# Patient Record
Sex: MALE | Race: WHITE
[De-identification: names, ages, dates, MRNs, and addresses within clinical notes are randomized per-mention and may not be internally consistent; named-entity substitution may affect disease eponyms.]

---

## 2018-04-01 ENCOUNTER — HOSPITAL ENCOUNTER (EMERGENCY)
Dept: HOSPITAL 62 - ER | Age: 33
End: 2018-04-01
Payer: SELF-PAY

## 2018-04-01 VITALS — DIASTOLIC BLOOD PRESSURE: 18 MMHG | SYSTOLIC BLOOD PRESSURE: 161 MMHG

## 2018-04-01 DIAGNOSIS — Q89.1: ICD-10-CM

## 2018-04-01 DIAGNOSIS — I46.9: Primary | ICD-10-CM

## 2018-04-01 LAB
ABSOLUTE LYMPHOCYTES# (MANUAL): 5 10^3/UL (ref 0.5–4.7)
ABSOLUTE MONOCYTES # (MANUAL): 0.8 10^3/UL (ref 0.1–1.4)
ABSOLUTE NEUTROPHILS# (MANUAL): 20.5 10^3/UL (ref 1.7–8.2)
ADD MANUAL DIFF: YES
ALBUMIN SERPL-MCNC: 2.2 G/DL (ref 3.5–5)
ALP SERPL-CCNC: 35 U/L (ref 38–126)
ALT SERPL-CCNC: 143 U/L (ref 21–72)
ANION GAP SERPL CALC-SCNC: 9 MMOL/L (ref 5–19)
ANISOCYTOSIS BLD QL SMEAR: SLIGHT
APPEARANCE UR: CLEAR
APTT PPP: COLORLESS S
AST SERPL-CCNC: 181 U/L (ref 17–59)
BASE EXCESS BLDV CALC-SCNC: -21.6 MMOL/L
BASOPHILS NFR BLD MANUAL: 0 % (ref 0–2)
BILIRUB DIRECT SERPL-MCNC: 0.3 MG/DL (ref 0–0.4)
BILIRUB SERPL-MCNC: 0.4 MG/DL (ref 0.2–1.3)
BILIRUB UR QL STRIP: NEGATIVE
BUN SERPL-MCNC: 40 MG/DL (ref 7–20)
CALCIUM: 5.8 MG/DL (ref 8.4–10.2)
CHLORIDE SERPL-SCNC: 94 MMOL/L (ref 98–107)
CO2 SERPL-SCNC: 12 MMOL/L (ref 22–30)
EOSINOPHIL NFR BLD MANUAL: 0 % (ref 0–6)
ERYTHROCYTE [DISTWIDTH] IN BLOOD BY AUTOMATED COUNT: 14.2 % (ref 11.5–14)
GLUCOSE SERPL-MCNC: 366 MG/DL (ref 75–110)
GLUCOSE UR STRIP-MCNC: 1000 MG/DL
HCO3 BLDV-SCNC: 11.6 MMOL/L (ref 20–32)
HCT VFR BLD CALC: 39.6 % (ref 37.9–51)
HGB BLD-MCNC: 13.2 G/DL (ref 13.5–17)
INR PPP: 1.95
KETONES UR STRIP-MCNC: NEGATIVE MG/DL
MCH RBC QN AUTO: 29.1 PG (ref 27–33.4)
MCHC RBC AUTO-ENTMCNC: 33.3 G/DL (ref 32–36)
MCV RBC AUTO: 87 FL (ref 80–97)
METAMYELOCYTES % (MANUAL): 4 %
MONOCYTES % (MANUAL): 3 % (ref 3–13)
MYELOCYTES % (MANUAL): 2 %
NEUTS BAND NFR BLD MANUAL: 19 % (ref 3–5)
NITRITE UR QL STRIP: NEGATIVE
PAPPENHEIMER BODIES: PRESENT
PCO2 BLDV: 60.4 MMHG (ref 35–63)
PH BLDV: 6.9 [PH] (ref 7.3–7.42)
PH UR STRIP: 7 [PH] (ref 5–9)
PLATELET # BLD: 292 10^3/UL (ref 150–450)
PLATELET CLUMP BLD QL SMEAR: PRESENT
PLATELET COMMENT: ADEQUATE
POLYCHROMASIA BLD QL SMEAR: SLIGHT
POTASSIUM SERPL-SCNC: 6 MMOL/L (ref 3.6–5)
PROT SERPL-MCNC: 3.8 G/DL (ref 6.3–8.2)
PROT UR STRIP-MCNC: 100 MG/DL
PROTHROMBIN TIME: 23.3 SEC (ref 11.4–15.4)
RBC # BLD AUTO: 4.53 10^6/UL (ref 4.35–5.55)
SEGMENTED NEUTROPHILS % (MAN): 53 % (ref 42–78)
SMUDGE CELLS # BLD: PRESENT 10*3/UL
SODIUM SERPL-SCNC: 114.6 MMOL/L (ref 137–145)
SP GR UR STRIP: 1.01
TOTAL CELLS COUNTED BLD: 100
UROBILINOGEN UR-MCNC: NEGATIVE MG/DL (ref ?–2)
VARIANT LYMPHS NFR BLD MANUAL: 18 % (ref 13–45)
WBC # BLD AUTO: 26.3 10^3/UL (ref 4–10.5)
WBC TOXIC VACUOLES BLD QL SMEAR: PRESENT

## 2018-04-01 PROCEDURE — 81001 URINALYSIS AUTO W/SCOPE: CPT

## 2018-04-01 PROCEDURE — 80053 COMPREHEN METABOLIC PANEL: CPT

## 2018-04-01 PROCEDURE — 82962 GLUCOSE BLOOD TEST: CPT

## 2018-04-01 PROCEDURE — 36556 INSERT NON-TUNNEL CV CATH: CPT

## 2018-04-01 PROCEDURE — 87040 BLOOD CULTURE FOR BACTERIA: CPT

## 2018-04-01 PROCEDURE — 85025 COMPLETE CBC W/AUTO DIFF WBC: CPT

## 2018-04-01 PROCEDURE — 06HM33Z INSERTION OF INFUSION DEVICE INTO RIGHT FEMORAL VEIN, PERCUTANEOUS APPROACH: ICD-10-PCS | Performed by: EMERGENCY MEDICINE

## 2018-04-01 PROCEDURE — 87086 URINE CULTURE/COLONY COUNT: CPT

## 2018-04-01 PROCEDURE — 36415 COLL VENOUS BLD VENIPUNCTURE: CPT

## 2018-04-01 PROCEDURE — 71045 X-RAY EXAM CHEST 1 VIEW: CPT

## 2018-04-01 PROCEDURE — 99291 CRITICAL CARE FIRST HOUR: CPT

## 2018-04-01 PROCEDURE — 0BH17EZ INSERTION OF ENDOTRACHEAL AIRWAY INTO TRACHEA, VIA NATURAL OR ARTIFICIAL OPENING: ICD-10-PCS | Performed by: EMERGENCY MEDICINE

## 2018-04-01 PROCEDURE — 96375 TX/PRO/DX INJ NEW DRUG ADDON: CPT

## 2018-04-01 PROCEDURE — 92950 HEART/LUNG RESUSCITATION CPR: CPT

## 2018-04-01 PROCEDURE — 82803 BLOOD GASES ANY COMBINATION: CPT

## 2018-04-01 PROCEDURE — 93010 ELECTROCARDIOGRAM REPORT: CPT

## 2018-04-01 PROCEDURE — 74018 RADEX ABDOMEN 1 VIEW: CPT

## 2018-04-01 PROCEDURE — 96365 THER/PROPH/DIAG IV INF INIT: CPT

## 2018-04-01 PROCEDURE — 94640 AIRWAY INHALATION TREATMENT: CPT

## 2018-04-01 PROCEDURE — C1751 CATH, INF, PER/CENT/MIDLINE: HCPCS

## 2018-04-01 PROCEDURE — 31500 INSERT EMERGENCY AIRWAY: CPT

## 2018-04-01 PROCEDURE — 93005 ELECTROCARDIOGRAM TRACING: CPT

## 2018-04-01 PROCEDURE — 83605 ASSAY OF LACTIC ACID: CPT

## 2018-04-01 PROCEDURE — 85610 PROTHROMBIN TIME: CPT

## 2018-04-01 NOTE — ER DOCUMENT REPORT
ED Resuscitation





- General


Chief Complaint: Cardiac Arrest


Stated Complaint: CARDIAC ARREST


Mode of Arrival: Medic


Information source: Parent, Emergency Med Personnel


Cannot obtain history due to: Intubated


Notes: 





32-year-old male with a history of adrenal hypoplasia presents via EMS in 

cardiac arrest.  Per EMS upon their arrival patient was talking, alert and 

awake when he suddenly became cyanotic and collapse.  Patient was found in a 

wide complex tachycardia and received 2 shocks and 3 rounds of epi prior to 

arrival.  Per the family patient has been complaining of gas and indigestion 

over the last few days and has been more fatigued.  Parents believe that 

patient has been compliant with his steroid medication.  They report no other 

significant past medical history.  Family history is significant for a possible 

clotting disorder of the father for which she was told it could be genetic but 

they do not know what the name is at this time.  Father is currently on Xarelto.





- HPI


Witnessed arrest: Yes


Bystander CPR?: Yes


Onset: Just prior to arrival


Symptoms prior to event: Defibrillator fired - EMS states patient was initially 

alert and talking when he suddenly became cyanotic from the neck up and 

collapsed.  Upon rhythm assessment a saw a wide complex tachycardia for which 

they administered 2 shocks.  Patient received epinephrine prior to arrival.  

EMS did achieve Ross but then lost it just prior to getting to the emergency 

department., Diaphoretic, Other - cyanotic.  negative: Chest pain, Leg swelling


Associated Symptoms: Other - Burping, burning chest pain.  No: Fever/Chills





- Paramedics initial findings


Unresponsive: Partially


Rhythm: Ventricular fibrillation - Ventricular tachycardia


Glucose (Mg/dl): 150


Pulse: None





- Pre-hospital treatment


Treatment: Intubated


Epinephrine Dose (mg): 5


Atropine Dose (mg): 1


Sodium Bicarb dose (amp): 2


Amiodarone dose (mg): 300





Past Medical History





- General


Information source: Parent





- Social History


Smoking Status: Never Smoker


Frequency of alcohol use: None


Drug Abuse: None


Lives with: Family


Family History: Other - clotting disorder





- Past Medical History


Cardiac Medical History: Reports: Other - adrenal hypoplasia


Pulmonary Medical History: Reports: None


EENT Medical History: Reports: None





Review of Systems





- Review of Systems


-: Yes ROS unobtainable due to patient's medical condition - Family reports a 

few days of patient complaining of burning in his chest.





Physical Exam





- Vital signs


Vitals: 


 











Pulse Ox


 


 100 


 


 18 13:00











Interpretation: Normal, Hypotensive





- General


General appearance: Unresponsive





- HEENT


Head: Normocephalic, Atraumatic


Eyes: Normal


Pupils: PERRL


Nasal: Normal


Mouth/Lips: Normal


Mucous membranes: Dry


Pharynx: Normal





- Respiratory


Respiratory status: Other - Patient arrived with Jaden airway.  Decreased breath 

sounds of the left lung.


Chest status: Nontender


Breath sounds: Normal


Chest palpation: Normal





- Cardiovascular


Heart sounds: Normal auscultation


Murmur: No


Pulses: Absent: Brachial, Radial, Carotid, Femoral


Normal capillary refill: No





- Abdominal


Inspection: Normal


Distension: Distended


Bowel sounds: Normal


Tenderness: Nontender


Organomegaly: No organomegaly





- Extremities


General upper extremity: Normal inspection, Nontender, Normal color, Normal ROM

, Normal temperature.  No: Edema


General lower extremity: Normal inspection, Nontender, Normal color, Normal ROM

, Normal temperature, Normal weight bearing.  No: Edema, Sydney's sign





Course





- Re-evaluation


Re-evalutation: 








Laboratory











  18





  13:02 14:07 15:52


 


WBC    26.3 H


 


RBC    4.53


 


Hgb    13.2 L


 


Hct    39.6


 


MCV    87


 


MCH    29.1


 


MCHC    33.3


 


RDW    14.2 H


 


Plt Count    292


 


Total Counted    100


 


Seg Neutrophils %    Not Reportable


 


Seg Neuts % (Manual)    53


 


Band Neutrophils %    19 H


 


Lymphocytes %    Not Reportable


 


Lymphocytes % (Manual)    18


 


Atypical Lymphs %    1


 


Monocytes %    Not Reportable


 


Monocytes % (Manual)    3


 


Eosinophils %    Not Reportable


 


Eosinophils % (Manual)    0


 


Basophils %    Not Reportable


 


Basophils % (Manual)    0


 


Metamyelocytes %    4 H


 


Myelocytes %    2 H


 


Absolute Neutrophils    Not Reportable


 


Abs Neuts (Manual)    20.5 H


 


Absolute Lymphocytes    Not Reportable


 


Abs Lymphs (Manual)    5.0 H


 


Absolute Monocytes    Not Reportable


 


Abs Monocytes (Manual)    0.8


 


Absolute Eosinophils    Not Reportable


 


Absolute Eos (Manual)    0.0


 


Absolute Basophils    Not Reportable


 


Abs Basophils (Manual)    0.0


 


Smudge Cells    PRESENT


 


Toxic Vacuolation    PRESENT


 


Clumped Platelets    PRESENT


 


Platelet Comment    ADEQUATE


 


Polychromasia    SLIGHT


 


Anisocytosis    SLIGHT


 


Pappenheimer Bodies    PRESENT


 


PT   


 


INR   


 


VBG pH   


 


VBG pCO2   


 


VBG HCO3   


 


VBG Base Excess   


 


Sodium   


 


Potassium   


 


Chloride   


 


Carbon Dioxide   


 


Anion Gap   


 


BUN   


 


Creatinine   


 


Est GFR ( Amer)   


 


Est GFR (Non-Af Amer)   


 


Glucose   


 


POC Glucose  150 H  


 


Lactic Acid   


 


Calcium   


 


Total Bilirubin   


 


Direct Bilirubin   


 


Neonat Total Bilirubin   


 


Neonat Direct Bilirubin   


 


Neonat Indirect Bili   


 


AST   


 


ALT   


 


Alkaline Phosphatase   


 


Total Protein   


 


Albumin   


 


Urine Color   COLORLESS 


 


Urine Appearance   CLEAR 


 


Urine pH   7.0 


 


Ur Specific Gravity   1.014 


 


Urine Protein   100 H 


 


Urine Glucose (UA)   1000 H 


 


Urine Ketones   NEGATIVE 


 


Urine Blood   MODERATE H 


 


Urine Nitrite   NEGATIVE 


 


Urine Bilirubin   NEGATIVE 


 


Urine Urobilinogen   NEGATIVE 


 


Ur Leukocyte Esterase   NEGATIVE 


 


Urine WBC (Auto)   8 


 


Urine RBC (Auto)   8 


 


Squamous Epi Cells Auto   1 


 


Urine Mucus (Auto)   OCC 


 


Urine Ascorbic Acid   NEGATIVE 














  18





  15:52 15:52 15:52


 


WBC   


 


RBC   


 


Hgb   


 


Hct   


 


MCV   


 


MCH   


 


MCHC   


 


RDW   


 


Plt Count   


 


Total Counted   


 


Seg Neutrophils %   


 


Seg Neuts % (Manual)   


 


Band Neutrophils %   


 


Lymphocytes %   


 


Lymphocytes % (Manual)   


 


Atypical Lymphs %   


 


Monocytes %   


 


Monocytes % (Manual)   


 


Eosinophils %   


 


Eosinophils % (Manual)   


 


Basophils %   


 


Basophils % (Manual)   


 


Metamyelocytes %   


 


Myelocytes %   


 


Absolute Neutrophils   


 


Abs Neuts (Manual)   


 


Absolute Lymphocytes   


 


Abs Lymphs (Manual)   


 


Absolute Monocytes   


 


Abs Monocytes (Manual)   


 


Absolute Eosinophils   


 


Absolute Eos (Manual)   


 


Absolute Basophils   


 


Abs Basophils (Manual)   


 


Smudge Cells   


 


Toxic Vacuolation   


 


Clumped Platelets   


 


Platelet Comment   


 


Polychromasia   


 


Anisocytosis   


 


Pappenheimer Bodies   


 


PT  23.3 H  


 


INR  1.95  


 


VBG pH   


 


VBG pCO2   


 


VBG HCO3   


 


VBG Base Excess   


 


Sodium   Cancelled 


 


Potassium   Cancelled 


 


Chloride   Cancelled 


 


Carbon Dioxide   Cancelled 


 


Anion Gap   Cancelled 


 


BUN   Cancelled 


 


Creatinine   Cancelled 


 


Est GFR ( Amer)   Cancelled 


 


Est GFR (Non-Af Amer)   Cancelled 


 


Glucose   Cancelled 


 


POC Glucose   


 


Lactic Acid    5.8 H


 


Calcium   Cancelled 


 


Total Bilirubin   Cancelled 


 


Direct Bilirubin   Cancelled 


 


Neonat Total Bilirubin   Cancelled 


 


Neonat Direct Bilirubin   Cancelled 


 


Neonat Indirect Bili   Cancelled 


 


AST   Cancelled 


 


ALT   Cancelled 


 


Alkaline Phosphatase   Cancelled 


 


Total Protein   Cancelled 


 


Albumin   Cancelled 


 


Urine Color   


 


Urine Appearance   


 


Urine pH   


 


Ur Specific Gravity   


 


Urine Protein   


 


Urine Glucose (UA)   


 


Urine Ketones   


 


Urine Blood   


 


Urine Nitrite   


 


Urine Bilirubin   


 


Urine Urobilinogen   


 


Ur Leukocyte Esterase   


 


Urine WBC (Auto)   


 


Urine RBC (Auto)   


 


Squamous Epi Cells Auto   


 


Urine Mucus (Auto)   


 


Urine Ascorbic Acid   














  18





  16:00 16:39


 


WBC  


 


RBC  


 


Hgb  


 


Hct  


 


MCV  


 


MCH  


 


MCHC  


 


RDW  


 


Plt Count  


 


Total Counted  


 


Seg Neutrophils %  


 


Seg Neuts % (Manual)  


 


Band Neutrophils %  


 


Lymphocytes %  


 


Lymphocytes % (Manual)  


 


Atypical Lymphs %  


 


Monocytes %  


 


Monocytes % (Manual)  


 


Eosinophils %  


 


Eosinophils % (Manual)  


 


Basophils %  


 


Basophils % (Manual)  


 


Metamyelocytes %  


 


Myelocytes %  


 


Absolute Neutrophils  


 


Abs Neuts (Manual)  


 


Absolute Lymphocytes  


 


Abs Lymphs (Manual)  


 


Absolute Monocytes  


 


Abs Monocytes (Manual)  


 


Absolute Eosinophils  


 


Absolute Eos (Manual)  


 


Absolute Basophils  


 


Abs Basophils (Manual)  


 


Smudge Cells  


 


Toxic Vacuolation  


 


Clumped Platelets  


 


Platelet Comment  


 


Polychromasia  


 


Anisocytosis  


 


Pappenheimer Bodies  


 


PT  


 


INR  


 


VBG pH  6.90 L* 


 


VBG pCO2  60.4 


 


VBG HCO3  11.6 L 


 


VBG Base Excess  -21.6 


 


Sodium   114.6 L*


 


Potassium   6.0 H*


 


Chloride   94 L


 


Carbon Dioxide   12 L


 


Anion Gap   9


 


BUN   40 H


 


Creatinine   0.93


 


Est GFR ( Amer)   > 60


 


Est GFR (Non-Af Amer)   > 60


 


Glucose   366 H


 


POC Glucose  


 


Lactic Acid  


 


Calcium   5.8 L*


 


Total Bilirubin   0.4


 


Direct Bilirubin   0.3


 


Neonat Total Bilirubin   Not Reportable


 


Neonat Direct Bilirubin   Not Reportable


 


Neonat Indirect Bili   Not Reportable


 


AST   181 H


 


ALT   143 H


 


Alkaline Phosphatase   35 L


 


Total Protein   3.8 L


 


Albumin   2.2 L


 


Urine Color  


 


Urine Appearance  


 


Urine pH  


 


Ur Specific Gravity  


 


Urine Protein  


 


Urine Glucose (UA)  


 


Urine Ketones  


 


Urine Blood  


 


Urine Nitrite  


 


Urine Bilirubin  


 


Urine Urobilinogen  


 


Ur Leukocyte Esterase  


 


Urine WBC (Auto)  


 


Urine RBC (Auto)  


 


Squamous Epi Cells Auto  


 


Urine Mucus (Auto)  


 


Urine Ascorbic Acid  











18 16:39


32-year-old male with a history of adrenal hypoplasia presents in cardiac 

arrest from home.  Per EMS upon their arrival patient was alert awake and 

talking when he suddenly became cyanotic, collapsed and was found to be in a 

wide complex tachycardia.  Patient received 2 shocks from EMS and 2 rounds of 

epinephrine.  They were able to achieve ROSC but patient arrived in asystole.  

Upon his arrival to the emergency department patient has a Jaden airway in place 

but is without pulse.  Patient received 2 A of sodium bicarb, 1 amp of calcium 

gluconate, magnesium, multiple rounds of epinephrine, 1 mg of atropine, 100 mg 

of hydrocortisone.  Jaden airway was replaced with 7 5 ET tube via glide scope.  

3IO's were placed in the right and left humerus and left tib-fib.  Patient was 

started on epinephrine and then changed to nor epi.  Multiple rounds of ACLS 

were performed.  We did achieve ROSC in  the emergency department and the 

patient had fluctuations in his blood pressure and pulse ox.  Initial chest x-

ray was significant for complete white out of the left lung. Central Line 

placement achieved in right femoral vein.  X-ray advised to pull back the to 2-

3 cm.  Just as we were attempting to pull back the tube and the patient became 

hypoxic and hypotensive. he was taken off the vent and we began bagging. repeat 

x-ray showed improvement of left sided opacities and appropriate tube placement

  Nor epi was increased without improvement.  Patient again lost pulses and 

resuscitation was restarted.  At that point the family was brought into the 

room and I explained that the patient would likely have poor outcomes if we 

were able to achieve ROSC.  I did have multiple conversations with the family 

prior to this.  They were brought into the room multiple times to see their 

son. Per the request of the mother resuscitation was stopped. Bedside 

ultrasound confirmed no cardiac activity on multiple pulse checks. 


18 17:26


Time of death 1707.


18 07:54








- Vital Signs


Vital signs: 


 











Temp Pulse Resp BP Pulse Ox


 


 94.9 F L     26 H  161/18 H  83 L


 


 18 17:00     18 17:00  18 16:49  18 17:00














- Laboratory


Result Diagrams: 


 18 15:52





 18 16:39


Laboratory results interpreted by me: 


 











  18





  13:02 14:07 15:52


 


WBC    26.3 H


 


Hgb    13.2 L


 


RDW    14.2 H


 


Band Neutrophils %    19 H


 


Metamyelocytes %    4 H


 


Myelocytes %    2 H


 


Abs Neuts (Manual)    20.5 H


 


Abs Lymphs (Manual)    5.0 H


 


PT   


 


VBG pH   


 


VBG HCO3   


 


Sodium   


 


Potassium   


 


Chloride   


 


Carbon Dioxide   


 


BUN   


 


Glucose   


 


POC Glucose  150 H  


 


Lactic Acid   


 


Calcium   


 


AST   


 


ALT   


 


Alkaline Phosphatase   


 


Total Protein   


 


Albumin   


 


Urine Protein   100 H 


 


Urine Glucose (UA)   1000 H 


 


Urine Blood   MODERATE H 














  18





  15:52 15:52 16:00


 


WBC   


 


Hgb   


 


RDW   


 


Band Neutrophils %   


 


Metamyelocytes %   


 


Myelocytes %   


 


Abs Neuts (Manual)   


 


Abs Lymphs (Manual)   


 


PT  23.3 H  


 


VBG pH    6.90 L*


 


VBG HCO3    11.6 L


 


Sodium   


 


Potassium   


 


Chloride   


 


Carbon Dioxide   


 


BUN   


 


Glucose   


 


POC Glucose   


 


Lactic Acid   5.8 H 


 


Calcium   


 


AST   


 


ALT   


 


Alkaline Phosphatase   


 


Total Protein   


 


Albumin   


 


Urine Protein   


 


Urine Glucose (UA)   


 


Urine Blood   














  18





  16:39


 


WBC 


 


Hgb 


 


RDW 


 


Band Neutrophils % 


 


Metamyelocytes % 


 


Myelocytes % 


 


Abs Neuts (Manual) 


 


Abs Lymphs (Manual) 


 


PT 


 


VBG pH 


 


VBG HCO3 


 


Sodium  114.6 L*


 


Potassium  6.0 H*


 


Chloride  94 L


 


Carbon Dioxide  12 L


 


BUN  40 H


 


Glucose  366 H


 


POC Glucose 


 


Lactic Acid 


 


Calcium  5.8 L*


 


AST  181 H


 


ALT  143 H


 


Alkaline Phosphatase  35 L


 


Total Protein  3.8 L


 


Albumin  2.2 L


 


Urine Protein 


 


Urine Glucose (UA) 


 


Urine Blood 














Procedures





- Central Line


  ** Right Femoral


Time completed: 03:30


Consent obtained: No


Central line pre-insertion: Sterile PPE donned, Chloraprep applied, Sterile 

drapes applied


Central line lumen type: Triple


Ultrasound guided: Yes


Line secured with sutures: No


Central line post-insertion: Blood return from lumens, Biopatch applied, 

Sterile dressing applied


Number of attempts: 2





- Intubation


  ** Orotracheal


Time of Intubation: 02:30


Airway evaluation: Large tongue


Mallampati Classification: Class 3


Medications: Etomidate


Intubation method: Orotracheal


Blade type: Other - 3.0 mac glide


ETT size: 7.5


ETT secured at: Teeth


ETT secured at (cm): 20


Breath Sounds after Intubation: Right greater than left


End tidal CO2 confirmed: Yes


Post Intubation Xray: Yes - advised to pull back 2cm


Intubation Complications: No complications





- Ultrasound/Bedside


  ** Ultrasound/Bedside


Ultrasound: Other - Ultrasound-guided central line placement





Discharge





- Discharge


Clinical Impression: 


 Cardiac arrest





Condition: Critical


Disposition:

## 2018-04-01 NOTE — RADIOLOGY REPORT (SQ)
EXAM DESCRIPTION:  CHEST SINGLE VIEW



COMPLETED DATE/TIME:  4/1/2018 4:29 pm



REASON FOR STUDY:  ETT ADJUSTMENT, FEMORAL LINE



COMPARISON:  4/1/2018 at 1329 hours.



EXAM PARAMETERS:  NUMBER OF VIEWS: One view.

TECHNIQUE: Single frontal radiographic view of the chest acquired.

RADIATION DOSE: NA

LIMITATIONS: None.



FINDINGS:  LUNGS AND PLEURA: Diffuse perihilar opacity in the left lung.

MEDIASTINUM AND HILAR STRUCTURES: No masses.  Contour normal.

HEART AND VASCULAR STRUCTURES: Mild cardiomegaly.  Vascular congestion.

BONES: No acute findings.

HARDWARE: Endotracheal tube with the tip located 3 cm proximal to the arnaldo.  Nasogastric tube with 
the tip in the stomach.

OTHER: No other significant finding.



IMPRESSION:  IMPROVED POSITION OF THE ENDOTRACHEAL TUBE.  OTHERWISE NO CHANGE.



TECHNICAL DOCUMENTATION:  JOB ID:  [id number]

 2011 [employer]- All Rights Reserved



Reading location - IP/workstation name: [email or web address]

## 2018-04-01 NOTE — RADIOLOGY REPORT (SQ)
EXAM DESCRIPTION:  KUB/ABDOMEN (SINGLE VIEW)



COMPLETED DATE/TIME:  4/1/2018 4:29 pm



REASON FOR STUDY:  ETT ADJUSTMENT, FEMORAL LINE



COMPARISON:  None.



NUMBER OF VIEWS:  One view.



TECHNIQUE:   Supine radiographic image of the abdomen acquired.



LIMITATIONS:  The lower abdomen is not included on the image.



FINDINGS:  BOWEL GAS PATTERN: Normal bowel gas pattern. No dilated loops.

CALCIFICATIONS: No suspicious calcifications.

SOFT TISSUES: No gross mass or suggestion of organomegaly.

HARDWARE: Nasogastric tube with the tip in the stomach.  Distal side hole may be at the gastroesophag
eal junction.  The tip of the right femoral catheter is visualized at the level of L5.

BONES: No acute fracture. No worrisome bone lesions.

OTHER: No other significant finding.



IMPRESSION:  HARDWARE AS DESCRIBED.  ADVANCEMENT OF THE NASOGASTRIC TUBE BY SEVERAL CM WOULD IMPROVE 
POSITIONING.  OTHERWISE NO RADIOGRAPHIC EVIDENCE FOR ACUTE ABDOMINAL DISEASE.



TECHNICAL DOCUMENTATION:  JOB ID:  [id number]

 2011 [employer]- All Rights Reserved



Reading location - IP/workstation name: [email or web address]

## 2018-04-01 NOTE — EKG REPORT
SEVERITY:- ABNORMAL ECG -

ATRIAL FIBRILLATION

LOW VOLTAGE IN FRONTAL LEADS

CONSIDER POSTERIOR INFARCT

BORDERLINE ST DEPRESSION, LATERAL LEADS

:

Confirmed by: Ismael Pride MD 01-Apr-2018 19:07:48

## 2018-04-01 NOTE — RADIOLOGY REPORT (SQ)
EXAM DESCRIPTION:  CHEST SINGLE VIEW



COMPLETED DATE/TIME:  4/1/2018 1:47 pm



REASON FOR STUDY:  ETT PLACEMENT, NG TUBE PLACEMENT



COMPARISON:  None.



EXAM PARAMETERS:  NUMBER OF VIEWS:  One view

TECHNIQUE:  Single frontal radiograph of the chest.

RADIATION DOSE: N/A

LIMITATIONS: None.



FINDINGS:  TEMPORARY SUPPORT DEVICES:ETT is in the right mainstem bronchus and should be pulled back 
2 to 3 cm.  NG appears to be in the stomach.

LUNGS AND PLEURA: Left basilar opacities. No effusions.  No masses. No pneumothorax.

MEDIASTINUM AND HILAR STRUCTURES: No masses.  Contour normal.

HEART AND VASCULAR STRUCTURES: Heart enlarged. Vascular congestion. Aorta normal for age.

BONES: No acute findings.

OTHER: No other significant finding.



IMPRESSION:  Vascular congestion.  Left basilar opacities.

ET tube is low and should be pulled back 2 to 3 cm.



TECHNICAL DOCUMENTATION:  JOB ID:  [id number]

 2011 [employer]- All Rights Reserved



Reading location - IP/workstation name: [email or web address]